# Patient Record
Sex: FEMALE | Race: WHITE | Employment: FULL TIME | URBAN - METROPOLITAN AREA
[De-identification: names, ages, dates, MRNs, and addresses within clinical notes are randomized per-mention and may not be internally consistent; named-entity substitution may affect disease eponyms.]

---

## 2017-11-10 ENCOUNTER — OFFICE VISIT (OUTPATIENT)
Dept: OTOLARYNGOLOGY | Facility: CLINIC | Age: 37
End: 2017-11-10
Payer: COMMERCIAL

## 2017-11-10 ENCOUNTER — OFFICE VISIT (OUTPATIENT)
Dept: AUDIOLOGY | Facility: CLINIC | Age: 37
End: 2017-11-10
Payer: COMMERCIAL

## 2017-11-10 VITALS — RESPIRATION RATE: 16 BRPM | WEIGHT: 150 LBS | BODY MASS INDEX: 25.61 KG/M2 | HEIGHT: 64 IN

## 2017-11-10 DIAGNOSIS — H92.03 REFERRED OTALGIA OF BOTH EARS: Primary | ICD-10-CM

## 2017-11-10 DIAGNOSIS — H93.13 TINNITUS OF BOTH EARS: Primary | ICD-10-CM

## 2017-11-10 PROCEDURE — 99203 OFFICE O/P NEW LOW 30 MIN: CPT | Performed by: OTOLARYNGOLOGY

## 2017-11-10 PROCEDURE — 92557 COMPREHENSIVE HEARING TEST: CPT | Performed by: AUDIOLOGIST

## 2017-11-10 PROCEDURE — 92567 TYMPANOMETRY: CPT | Performed by: AUDIOLOGIST

## 2017-11-10 PROCEDURE — 99207 ZZC NO CHARGE LOS: CPT | Performed by: AUDIOLOGIST

## 2017-11-10 NOTE — NURSING NOTE
"Chief Complaint   Patient presents with     Consult     Pain and ringing in Ears       Initial Resp 16  Ht 1.626 m (5' 4\")  Wt 68 kg (150 lb)  BMI 25.75 kg/m2 Estimated body mass index is 25.75 kg/(m^2) as calculated from the following:    Height as of this encounter: 1.626 m (5' 4\").    Weight as of this encounter: 68 kg (150 lb).  Medication Reconciliation: complete   Vanessa Carrera CMA 11/10/2017 1:18 PM      "

## 2017-11-10 NOTE — PROGRESS NOTES
"Chief Complaint - ear pain    History of Present Illness - Juani Wang is a 37 year old female who presents with the new onset of ear pain and tinnitus. Both come and go, but tinnitus is more often. The patient describes this as sudden episodes of pain deep in the ears, often behind the ear lobe. The patient is not sure whether or not they grind their teeth, and no recent dental work. However, she uses a mouth guard at night. They have never had a history of ear disease in the past, no previous ear surgery or chronic ear infection as an adult.  Feels like she is having a worse time hearing. Ibuprofen and aleve sometimes for the ear pain, doesn't help.     Past Medical History -   Patient Active Problem List   Diagnosis     Bell's palsy       Current Medications -   Current Outpatient Prescriptions:      UNKNOWN TO PATIENT, , Disp: , Rfl:      UNKNOWN TO PATIENT, , Disp: , Rfl:      UNKNOWN TO PATIENT, , Disp: , Rfl:      Naproxen Sodium (ALEVE PO), , Disp: , Rfl:      phenazopyridine (PYRIDIUM) 200 MG tablet, Take 1 tablet (200 mg) by mouth 3 times daily as needed for pain (with urination), Disp: 10 tablet, Rfl: 0     oxyCODONE-acetaminophen (PERCOCET) 5-325 MG per tablet, Take 1-2 tablets by mouth every 6 hours as needed for pain, Disp: 20 tablet, Rfl: 0    Allergies - No Known Allergies    Social History -   Social History     Social History     Marital status: Single     Spouse name: N/A     Number of children: N/A     Years of education: N/A     Social History Main Topics     Smoking status: Current Every Day Smoker     Packs/day: 0.25     Smokeless tobacco: Never Used     Alcohol use Yes      Comment: occ     Drug use: No     Sexual activity: No     Other Topics Concern     None     Social History Narrative       Family History - no ear problems in family.     Review of Systems - As per HPI and PMHx, otherwise 7 system review of the head and neck negative.    Physical Exam  Resp 16  Ht 1.626 m (5' 4\")  Wt 68 " kg (150 lb)  BMI 25.75 kg/m2  General - The patient is in no distress.  Alert and oriented to person and place, answers questions and cooperates with examination appropriately.   Neurologic - CN II-XII are grossly intact, no focal neurologic deficits.   Voice and Breathing - The patient was breathing comfortably without the use of accessory muscles. There was no wheezing, stridor, or stertor.  The patients voice was clear and strong.  Eyes - Extraocular movements intact. Sclera were not icteric or injected, conjunctiva were pink and moist.  Ears - The tympanic membranes are normal in appearance, bony landmarks are intact.  No retraction, perforation, or masses. No fluid or purulence was seen in the external canal or the middle ear. No evidence of infection of the middle ear or external canal, cerumen was normal in appearance.  Mouth - Dentition in fair condition, no masses, ulcerations, or erosions noted on examination of mucosa. The tongue is mobile and midline, and the uvula is midline on elevation. Palpation of the TMJs were normal.   Throat - The walls of the oropharynx were smooth, symmetric, and had no lesions or ulcerations. The uvula was midline on elevation.    Neck - Palpation of the occipital, submental, submandibular, internal jugular chain, and supraclavicular nodes did not demonstrate any abnormal lymph nodes or masses. Palpation of the thyroid was soft and smooth, with no nodules or goiter appreciated.  The trachea was mobile and midline.  Neurological - Cranial nerves 2 through 12 were grossly intact. No focal neurologic deficits.    Audiologic Studies - An audiogram and tympanogram were performed today as part of the evaluation and personally reviewed. The tympanogram shows normal Type A curves, with normal canal volumes and middle ear pressures.  There is no sign of eustachian tube dysfunction or middle ear effusion.  The audiogram was also normal.  The sensorineural hearing was age-appropriate,  with no evidence of conductive hearing loss or significant asymmetry.    A/P - Juani Wang is a 37 year old female who presents with otalgia.  Based on today's history and physical exam, I can find no evidence of middle ear pathology or eustachian tube dysfunction. This could be temporomandibular syndrome, but it doesn't seem like that either. She already wears a mouth guard. I have given the patient an instructional sheet of things to be tried at home for TMJ in case. It is intermittent and bilateral so it seems unlikely it is anything bad. We can discuss imaging if things worsening. For tinnitus try stopping NSAIDS. Hearing was okay. Try masking.     Antwon Victoria MD  Otolaryngology  [facility]

## 2017-11-10 NOTE — PATIENT INSTRUCTIONS
General Scheduling Information  To schedule your CT/MRI scan, please contact [place] at [phone]   [address] [place] [address]    To schedule your Surgery, please contact our Specialty Schedulers at [phone]    ENT Clinic Locations Clinic Hours Telephone Number     [facility]  [address] [place] [address]   Tuesday:       8:00am -- 4:00pm    Wednesday:  8:00am - 4:00pm   To schedule an appointment with   Dr. Victoria,   please contact our   Specialty Scheduling Department at:     [phone]       [facility]  [address] [place] [address]   Friday:          8:00am - 4:00pm         Urgent Care Locations Clinic Hours Telephone Numbers     Yasmin Rodríguez  [address] [place] [address]     Monday-Friday:     11:00pm - 9:00pm    Saturday-Sunday:  9:00am - 5:00pm   [phone]     Y[facility] Hurley  [address] [place] [address]     Monday-Friday:      5:00pm - 9:00pm     Saturday-Sunday:  9:00am - 5:00pm   [phone]

## 2017-11-10 NOTE — PROGRESS NOTES
AUDIOLOGY REPORT: HEARING EXAM    SUBJECTIVE:  Juani Wang is a 37 year old female referred to audiology from ENT by Dr. Victoria for a hearing examination. Patient reports constant ringing in both ears and occasional pain behind both of her ears for the past 2-3 months.    OBJECTIVE:    Otoscopy:   RIGHT: clear ear canal   LEFT:  clear ear canal    Tympanometry:   RIGHT:  normal eardrum mobility   LEFT:   normal eardrum mobility    Thresholds:   Pure Tone Thresholds assessed using conventional audiometry with good  reliability from 250-8000 Hz bilaterally using circumaural headphones.   RIGHT:  normal hearing sensitivity for all frequencies tested   LEFT:   normal hearing sensitivity for all frequencies tested    Speech Reception Threshold:   RIGHT:  15 dB HL   LEFT:    15 dB HL    Word Recognition Score:    RIGHT:  96% at 50 dB HL using NU-6 recorded word list   LEFT:    96% at 50 dB HL using NU-6 recorded word list    ASSESSMENT:  Tinnitus of both ears    Discussed results with the patient.     PLAN:  Patient was returned to ENT for follow up.     Mariela Gonzalez.  Licensed Audiologist, [place] #[id number]  [facility]  11/10/2017

## 2017-11-10 NOTE — LETTER
11/10/2017         RE: Juani Wang  [address] [address] [facility] [address]        Dear Colleague,    Thank you for referring your patient, Juani Wang, to the [facility]. Please see a copy of my visit note below.    Chief Complaint - ear pain    History of Present Illness - Juani Wang is a 37 year old female who presents with the new onset of ear pain and tinnitus. Both come and go, but tinnitus is more often. The patient describes this as sudden episodes of pain deep in the ears, often behind the ear lobe. The patient is not sure whether or not they grind their teeth, and no recent dental work. However, she uses a mouth guard at night. They have never had a history of ear disease in the past, no previous ear surgery or chronic ear infection as an adult.  Feels like she is having a worse time hearing. Ibuprofen and aleve sometimes for the ear pain, doesn't help.     Past Medical History -   Patient Active Problem List   Diagnosis     Bell's palsy       Current Medications -   Current Outpatient Prescriptions:      UNKNOWN TO PATIENT, , Disp: , Rfl:      UNKNOWN TO PATIENT, , Disp: , Rfl:      UNKNOWN TO PATIENT, , Disp: , Rfl:      Naproxen Sodium (ALEVE PO), , Disp: , Rfl:      phenazopyridine (PYRIDIUM) 200 MG tablet, Take 1 tablet (200 mg) by mouth 3 times daily as needed for pain (with urination), Disp: 10 tablet, Rfl: 0     oxyCODONE-acetaminophen (PERCOCET) 5-325 MG per tablet, Take 1-2 tablets by mouth every 6 hours as needed for pain, Disp: 20 tablet, Rfl: 0    Allergies - No Known Allergies    Social History -   Social History     Social History     Marital status: Single     Spouse name: N/A     Number of children: N/A     Years of education: N/A     Social History Main Topics     Smoking status: Current Every Day Smoker     Packs/day: 0.25     Smokeless tobacco: Never Used     Alcohol use Yes      Comment: occ     Drug use: No     Sexual activity: No     Other Topics Concern  "    None     Social History Narrative       Family History - no ear problems in family.     Review of Systems - As per HPI and PMHx, otherwise 7 system review of the head and neck negative.    Physical Exam  Resp 16  Ht 1.626 m (5' 4\")  Wt 68 kg (150 lb)  BMI 25.75 kg/m2  General - The patient is in no distress.  Alert and oriented to person and place, answers questions and cooperates with examination appropriately.   Neurologic - CN II-XII are grossly intact, no focal neurologic deficits.   Voice and Breathing - The patient was breathing comfortably without the use of accessory muscles. There was no wheezing, stridor, or stertor.  The patients voice was clear and strong.  Eyes - Extraocular movements intact. Sclera were not icteric or injected, conjunctiva were pink and moist.  Ears - The tympanic membranes are normal in appearance, bony landmarks are intact.  No retraction, perforation, or masses. No fluid or purulence was seen in the external canal or the middle ear. No evidence of infection of the middle ear or external canal, cerumen was normal in appearance.  Mouth - Dentition in fair condition, no masses, ulcerations, or erosions noted on examination of mucosa. The tongue is mobile and midline, and the uvula is midline on elevation. Palpation of the TMJs were normal.   Throat - The walls of the oropharynx were smooth, symmetric, and had no lesions or ulcerations. The uvula was midline on elevation.    Neck - Palpation of the occipital, submental, submandibular, internal jugular chain, and supraclavicular nodes did not demonstrate any abnormal lymph nodes or masses. Palpation of the thyroid was soft and smooth, with no nodules or goiter appreciated.  The trachea was mobile and midline.  Neurological - Cranial nerves 2 through 12 were grossly intact. No focal neurologic deficits.    Audiologic Studies - An audiogram and tympanogram were performed today as part of the evaluation and personally reviewed. The " tympanogram shows normal Type A curves, with normal canal volumes and middle ear pressures.  There is no sign of eustachian tube dysfunction or middle ear effusion.  The audiogram was also normal.  The sensorineural hearing was age-appropriate, with no evidence of conductive hearing loss or significant asymmetry.    A/P - Juani Wang is a 37 year old female who presents with otalgia.  Based on today's history and physical exam, I can find no evidence of middle ear pathology or eustachian tube dysfunction. This could be temporomandibular syndrome, but it doesn't seem like that either. She already wears a mouth guard. I have given the patient an instructional sheet of things to be tried at home for TMJ in case. It is intermittent and bilateral so it seems unlikely it is anything bad. We can discuss imaging if things worsening. For tinnitus try stopping NSAIDS. Hearing was okay. Try masking.     Antwon Victoria MD  Otolaryngology  [facility]      Again, thank you for allowing me to participate in the care of your patient.        Sincerely,        Antwon Victoria MD

## 2017-11-10 NOTE — MR AVS SNAPSHOT
"              After Visit Summary   11/10/2017    Juani Wang    MRN: [id number]           Patient Information     Date Of Birth          1980        Visit Information        Provider Department      11/10/2017 1:00 PM Santy Munoz, [facility]        Today's Diagnoses     Tinnitus of both ears    -  1       Follow-ups after your visit        Who to contact     If you have questions or need follow up information about today's clinic visit or your schedule please contact [facility] directly at [phone].  Normal or non-critical lab and imaging results will be communicated to you by MyChart, letter or phone within 4 business days after the clinic has received the results. If you do not hear from us within 7 days, please contact the clinic through [employer] or phone. If you have a critical or abnormal lab result, we will notify you by phone as soon as possible.  Submit refill requests through [employer] or call your pharmacy and they will forward the refill request to us. Please allow 3 business days for your refill to be completed.          Additional Information About Your Visit        MyChart Information     [employer] lets you send messages to your doctor, view your test results, renew your prescriptions, schedule appointments and more. To sign up, go to [email or web address] . Click on \"Log in\" on the left side of the screen, which will take you to the Welcome page. Then click on \"Sign up Now\" on the right side of the page.     You will be asked to enter the access code listed below, as well as some personal information. Please follow the directions to create your username and password.     Your access code is: [email or web address]  Expires: 2018  2:31 PM     Your access code will  in 90 days. If you need help or a new code, please call your [facility] or [phone].        Care EveryWhere ID     This is your Care EveryWhere ID. This could be used by other " organizations to access your [facility] medical records  [id number]         Blood Pressure from Last 3 Encounters:   07/21/14 105/66   07/21/14 103/73    Weight from Last 3 Encounters:   11/10/17 150 lb (68 kg)   07/21/14 150 lb (68 kg)   07/21/14 150 lb (68 kg)              We Performed the Following     AUDIOGRAM/TYMPANOGRAM - INTERFACE     COMPREHENSIVE HEARING TEST     TYMPANOMETRY        Primary Care Provider Office Phone # Fax #    Sheeba Hattie Green [phone]       [facility] [address] [facility] [address]        Equal Access to Services     [facility] Center[facility] : Hadii aad ku hadasho Soomaali, waaxda luqadaha, qaybta kaalmada adeegyada, waxay idiin hayaan adeeg kharajessica lamatteo . So [facility] [phone].    ATENCIÓN: Si habla español, tiene a scanlon disposición servicios gratuitos de asistencia lingüística. Ll[facility]. [facility] [phone].    We comply with applicable federal civil rights laws and [place] laws. We do not discriminate on the basis of race, color, national origin, age, disability, sex, sexual orientation, or gender identity.            Thank you!     Thank you for choosing [facility]  for your care. Our goal is always to provide you with excellent care. Hearing back from our patients is one way we can continue to improve our services. Please take a few minutes to complete the written survey that you may receive in the mail after your visit with us. Thank you!             Your Updated Medication List - Protect others around you: Learn how to safely use, store and throw away your medicines at www.disposemymeds.org.          This list is accurate as of: 11/10/17  2:31 PM.  Always use your most recent med list.                   Brand Name Dispense Instructions for use Diagnosis    ALEVE PO           oxyCODONE-acetaminophen 5-325 MG per tablet    PERCOCET    20 tablet    Take 1-2 tablets by mouth every 6 hours as needed for pain        phenazopyridine 200 MG  tablet    PYRIDIUM    10 tablet    Take 1 tablet (200 mg) by mouth 3 times daily as needed for pain (with urination)        * UNKNOWN TO PATIENT           * UNKNOWN TO PATIENT           * UNKNOWN TO PATIENT           * Notice:  This list has 3 medication(s) that are the same as other medications prescribed for you. Read the directions carefully, and ask your doctor or other care provider to review them with you.

## 2017-11-10 NOTE — MR AVS SNAPSHOT
After Visit Summary   11/10/2017    Juani Wang    MRN: [id number]           Patient Information     Date Of Birth          1980        Visit Information        Provider Department      11/10/2017 1:30 PM Antwon Victoria MD [facility] [place]        Today's Diagnoses     Referred otalgia of both ears    -  1      Care Instructions    General Scheduling Information  To schedule your CT/MRI scan, please contact [place] at [phone] [address] [place] [address]    To schedule your Surgery, please contact our Specialty Schedulers at [phone]    ENT Clinic Locations Clinic Hours Telephone Number     Otto Trudy  [address]  [place] [address]   Tuesday:       8:00am -- 4:00pm    Wednesday:  8:00am - 4:00pm   To schedule an appointment with   Dr. Victoria,   please contact our   Specialty Scheduling Department at:     [phone]       [facility]  [address] [place] [address]   Friday:          8:00am - 4:00pm         Urgent Care Locations Clinic Hours Telephone Numbers     [facility]  [address]  [place] [address]     Monday-Friday:     11:00pm - 9:00pm    Saturday-Sunday:  9:00am - 5:00pm   [phone]     [facility]  [address] [place] [address]     Monday-Friday:      5:00pm - 9:00pm     Saturday-Sunday:  9:00am - 5:00pm   [phone]               Follow-ups after your visit        Who to contact     If you have questions or need follow up information about today's clinic visit or your schedule please contact [facility] directly at [phone].  Normal or non-critical lab and imaging results will be communicated to you by MyChart, letter or phone within 4 business days after the clinic has received the results. If you do not hear from us within 7 days, please contact the clinic through MyChart or phone. If you have a critical or abnormal lab result, we will notify you  "by phone as soon as possible.  Submit refill requests through [employer] or call your pharmacy and they will forward the refill request to us. Please allow 3 business days for your refill to be completed.          Additional Information About Your Visit        [employer] Information     [employer] lets you send messages to your doctor, view your test results, renew your prescriptions, schedule appointments and more. To sign up, go to [email or web address] . Click on \"Log in\" on the left side of the screen, which will take you to the Welcome page. Then click on \"Sign up Now\" on the right side of the page.     You will be asked to enter the access code listed below, as well as some personal information. Please follow the directions to create your username and password.     Your access code is: [email or web address]  Expires: 2018  2:31 PM     Your access code will  in 90 days. If you need help or a new code, please call your [facility] clinic or [phone].        Care EveryWhere ID     This is your Care EveryWhere ID. This could be used by other organizations to access your [facility] medical records  [id number]        Your Vitals Were     Respirations Height BMI (Body Mass Index)             16 1.626 m (5' 4\") 25.75 kg/m2          Blood Pressure from Last 3 Encounters:   14 105/66   14 103/73    Weight from Last 3 Encounters:   11/10/17 68 kg (150 lb)   14 68 kg (150 lb)   14 68 kg (150 lb)              Today, you had the following     No orders found for display       Primary Care Provider Office Phone # Fax #    Sheeba Green [phone]       [facility] [address] [facility] [address]        Equal Access to Services     BG FINLEY : Lora Webster, wadanielda luqadaha, qaybta kaalmaamber mcdermott, minal roper. So [facility] [phone].    ATENCIÓN: Si habla español, tiene a scanlon disposición servicios gratuitos de " asistencia lingüística. Pillo al [phone].    We comply with applicable federal civil rights laws and [place] laws. We do not discriminate on the basis of race, color, national origin, age, disability, sex, sexual orientation, or gender identity.            Thank you!     Thank you for choosing [facility]  for your [facility]. Our goal is always to provide you with excellent care. Hearing back from our patients is one way we can continue to improve our services. Please take a few minutes to complete the written survey that you may receive in the mail after your visit with us. Thank you!             Your Updated Medication List - Protect others around you: Learn how to safely use, store and throw away your medicines at www.disposemymeds.org.          This list is accurate as of: 11/10/17  4:17 PM.  Always use your most recent med list.                   Brand Name Dispense Instructions for use Diagnosis    ALEVE PO           oxyCODONE-acetaminophen 5-325 MG per tablet    PERCOCET    20 tablet    Take 1-2 tablets by mouth every 6 hours as needed for pain        phenazopyridine 200 MG tablet    PYRIDIUM    10 tablet    Take 1 tablet (200 mg) by mouth 3 times daily as needed for pain (with urination)        * UNKNOWN TO PATIENT           * UNKNOWN TO PATIENT           * UNKNOWN TO PATIENT           * Notice:  This list has 3 medication(s) that are the same as other medications prescribed for you. Read the directions carefully, and ask your doctor or other care provider to review them with you.